# Patient Record
Sex: MALE | Race: WHITE
[De-identification: names, ages, dates, MRNs, and addresses within clinical notes are randomized per-mention and may not be internally consistent; named-entity substitution may affect disease eponyms.]

---

## 2020-10-05 ENCOUNTER — HOSPITAL ENCOUNTER (OUTPATIENT)
Dept: HOSPITAL 26 - MED | Age: 75
Setting detail: OBSERVATION
LOS: 1 days | Discharge: HOME | End: 2020-10-06
Attending: FAMILY MEDICINE | Admitting: FAMILY MEDICINE
Payer: COMMERCIAL

## 2020-10-05 VITALS — SYSTOLIC BLOOD PRESSURE: 128 MMHG | DIASTOLIC BLOOD PRESSURE: 57 MMHG

## 2020-10-05 VITALS — BODY MASS INDEX: 27.7 KG/M2 | WEIGHT: 187 LBS | HEIGHT: 69 IN

## 2020-10-05 DIAGNOSIS — I10: ICD-10-CM

## 2020-10-05 DIAGNOSIS — E78.5: ICD-10-CM

## 2020-10-05 DIAGNOSIS — R07.89: Primary | ICD-10-CM

## 2020-10-05 DIAGNOSIS — E78.00: ICD-10-CM

## 2020-10-05 DIAGNOSIS — I73.9: ICD-10-CM

## 2020-10-05 DIAGNOSIS — R94.31: ICD-10-CM

## 2020-10-05 DIAGNOSIS — F17.210: ICD-10-CM

## 2020-10-05 DIAGNOSIS — E87.6: ICD-10-CM

## 2020-10-05 DIAGNOSIS — Z79.899: ICD-10-CM

## 2020-10-05 LAB
ALBUMIN FLD-MCNC: 3.8 G/DL (ref 3.4–5)
AMYLASE SERPL-CCNC: 86 U/L (ref 25–115)
ANION GAP SERPL CALCULATED.3IONS-SCNC: 15.2 MMOL/L (ref 8–16)
AST SERPL-CCNC: 13 U/L (ref 15–37)
BASOPHILS # BLD AUTO: 0.1 K/UL (ref 0–0.22)
BASOPHILS NFR BLD AUTO: 1.4 % (ref 0–2)
BILIRUB SERPL-MCNC: 0.5 MG/DL (ref 0–1)
BUN SERPL-MCNC: 11 MG/DL (ref 7–18)
CHLORIDE SERPL-SCNC: 102 MMOL/L (ref 98–107)
CHOLEST/HDLC SERPL: 2.9 {RATIO} (ref 1–4.5)
CK MB SERPL-MCNC: 0.6 NG/ML (ref 0–3.6)
CO2 SERPL-SCNC: 24 MMOL/L (ref 21–32)
CREAT SERPL-MCNC: 0.9 MG/DL (ref 0.6–1.3)
EOSINOPHIL # BLD AUTO: 0.4 K/UL (ref 0–0.4)
EOSINOPHIL NFR BLD AUTO: 6.6 % (ref 0–4)
ERYTHROCYTE [DISTWIDTH] IN BLOOD BY AUTOMATED COUNT: 13.9 % (ref 11.6–13.7)
GFR SERPL CREATININE-BSD FRML MDRD: (no result) ML/MIN (ref 90–?)
GLUCOSE SERPL-MCNC: 123 MG/DL (ref 74–106)
HCT VFR BLD AUTO: 36.1 % (ref 36–52)
HDLC SERPL-MCNC: 50 MG/DL (ref 40–60)
HGB BLD-MCNC: 12.4 G/DL (ref 12–18)
LDLC SERPL CALC-MCNC: 49 MG/DL (ref 60–100)
LIPASE SERPL-CCNC: 101 U/L (ref 73–393)
LIPASE SERPL-CCNC: 96 U/L (ref 73–393)
LYMPHOCYTES # BLD AUTO: 1.5 K/UL (ref 2–11.5)
LYMPHOCYTES NFR BLD AUTO: 26.2 % (ref 20.5–51.1)
MAGNESIUM SERPL-MCNC: 2.2 MG/DL (ref 1.8–2.4)
MCH RBC QN AUTO: 32 PG (ref 27–31)
MCHC RBC AUTO-ENTMCNC: 34 G/DL (ref 33–37)
MCV RBC AUTO: 92.3 FL (ref 80–94)
MONOCYTES # BLD AUTO: 0.5 K/UL (ref 0.8–1)
MONOCYTES NFR BLD AUTO: 9.3 % (ref 1.7–9.3)
NEUTROPHILS # BLD AUTO: 3.2 K/UL (ref 1.8–7.7)
NEUTROPHILS NFR BLD AUTO: 56.5 % (ref 42.2–75.2)
PHOSPHATE SERPL-MCNC: 3.4 MG/DL (ref 2.5–4.9)
PLATELET # BLD AUTO: 213 K/UL (ref 140–450)
POTASSIUM SERPL-SCNC: 3.2 MMOL/L (ref 3.5–5.1)
PROTHROMBIN TIME: 10.7 SECS (ref 10.8–13.4)
RBC # BLD AUTO: 3.91 MIL/UL (ref 4.2–6.1)
SODIUM SERPL-SCNC: 138 MMOL/L (ref 136–145)
T4 FREE SERPL-MCNC: 1.03 NG/DL (ref 0.76–1.46)
TRIGL SERPL-MCNC: 236 MG/DL (ref 30–150)
TSH SERPL DL<=0.05 MIU/L-ACNC: 1.55 UIU/ML (ref 0.34–3.74)
WBC # BLD AUTO: 5.6 K/UL (ref 4.8–10.8)

## 2020-10-05 PROCEDURE — 80048 BASIC METABOLIC PNL TOTAL CA: CPT

## 2020-10-05 PROCEDURE — 36415 COLL VENOUS BLD VENIPUNCTURE: CPT

## 2020-10-05 PROCEDURE — 83036 HEMOGLOBIN GLYCOSYLATED A1C: CPT

## 2020-10-05 PROCEDURE — 96361 HYDRATE IV INFUSION ADD-ON: CPT

## 2020-10-05 PROCEDURE — 96372 THER/PROPH/DIAG INJ SC/IM: CPT

## 2020-10-05 PROCEDURE — G0378 HOSPITAL OBSERVATION PER HR: HCPCS

## 2020-10-05 PROCEDURE — 83880 ASSAY OF NATRIURETIC PEPTIDE: CPT

## 2020-10-05 PROCEDURE — 80061 LIPID PANEL: CPT

## 2020-10-05 PROCEDURE — 84100 ASSAY OF PHOSPHORUS: CPT

## 2020-10-05 PROCEDURE — 84443 ASSAY THYROID STIM HORMONE: CPT

## 2020-10-05 PROCEDURE — 83735 ASSAY OF MAGNESIUM: CPT

## 2020-10-05 PROCEDURE — 93005 ELECTROCARDIOGRAM TRACING: CPT

## 2020-10-05 PROCEDURE — 82550 ASSAY OF CK (CPK): CPT

## 2020-10-05 PROCEDURE — 93925 LOWER EXTREMITY STUDY: CPT

## 2020-10-05 PROCEDURE — 83690 ASSAY OF LIPASE: CPT

## 2020-10-05 PROCEDURE — 82553 CREATINE MB FRACTION: CPT

## 2020-10-05 PROCEDURE — 85025 COMPLETE CBC W/AUTO DIFF WBC: CPT

## 2020-10-05 PROCEDURE — 71045 X-RAY EXAM CHEST 1 VIEW: CPT

## 2020-10-05 PROCEDURE — 80053 COMPREHEN METABOLIC PANEL: CPT

## 2020-10-05 PROCEDURE — 85730 THROMBOPLASTIN TIME PARTIAL: CPT

## 2020-10-05 PROCEDURE — 84439 ASSAY OF FREE THYROXINE: CPT

## 2020-10-05 PROCEDURE — 99285 EMERGENCY DEPT VISIT HI MDM: CPT

## 2020-10-05 PROCEDURE — 82150 ASSAY OF AMYLASE: CPT

## 2020-10-05 PROCEDURE — 93970 EXTREMITY STUDY: CPT

## 2020-10-05 PROCEDURE — 87081 CULTURE SCREEN ONLY: CPT

## 2020-10-05 PROCEDURE — 96360 HYDRATION IV INFUSION INIT: CPT

## 2020-10-05 PROCEDURE — 85610 PROTHROMBIN TIME: CPT

## 2020-10-05 PROCEDURE — 84484 ASSAY OF TROPONIN QUANT: CPT

## 2020-10-05 RX ADMIN — Medication SCH MG: at 21:00

## 2020-10-05 RX ADMIN — SODIUM CHLORIDE SCH MLS/HR: 9 INJECTION, SOLUTION INTRAVENOUS at 22:13

## 2020-10-05 NOTE — NUR
BIB self from home with c/o chest pain today and c/o groin pain x 4 weeks. Pain 
8/10

Hungarian speaking only. A, A, Ox4, cooperative, VVS, sinus rhythm in NAD

Resp even and unlabored, HOB elevated.

Moving all exts w/o difficulty, gait steady

Awaiting evaluation by MD

Will continue to monitor

## 2020-10-05 NOTE — NUR
RECEIVED CONTINUITY OF CARE FROM ED RN RICKY. PT IS AMBULATORY, A/OX3, BREATHING 
SPONTANEOUSLY ON 02L 02 VIA NC, SATURATING %. LUNG ARE CTA, ACTIVE BOWEL TONES X4, 
SKIN IS WARM, DRY, INTACT. PT IS Faroese SPEAKING WITH LIMITED ENGLISH. VITALS ARE ASSESSED 
AND WITHIN NORMAL LIMITS. ORIENTED PT TO STAFF, CALL LIGHT, AND ROOM. MRSA SCREEN COMPLETE 
AND REVIEWED PLAN OF CARE WITH PT. CALL LIGHT IS WITHIN REACH. SAFETY PRECAUTIONS IN PLACE.

## 2020-10-05 NOTE — NUR
Patient will be admitted to care of DR SEO. Admited to TELE.  Will go to room 
111A. Belongings list completed.  Report to CIARRA ARRIAZA.

## 2020-10-05 NOTE — NUR
PT IS STILL HAVING CHEST DISCOMFORT TO RIGHT SIDE OF CHEST, MEDICATED WITH 
ADMIT ORDERS FOR NORCO.  PAIN 6/10.  NO SOB.  RESPIRATIONS REGULAR EVEN AND 
UNLABORED

## 2020-10-05 NOTE — NUR
Detailed report given to ARSALAN Jones night shift for continuity

Orders and meds reviewed, questions answered

## 2020-10-06 VITALS — DIASTOLIC BLOOD PRESSURE: 71 MMHG | SYSTOLIC BLOOD PRESSURE: 145 MMHG

## 2020-10-06 VITALS — DIASTOLIC BLOOD PRESSURE: 48 MMHG | SYSTOLIC BLOOD PRESSURE: 96 MMHG

## 2020-10-06 VITALS — SYSTOLIC BLOOD PRESSURE: 126 MMHG | DIASTOLIC BLOOD PRESSURE: 60 MMHG

## 2020-10-06 VITALS — DIASTOLIC BLOOD PRESSURE: 57 MMHG | SYSTOLIC BLOOD PRESSURE: 120 MMHG

## 2020-10-06 VITALS — SYSTOLIC BLOOD PRESSURE: 121 MMHG | DIASTOLIC BLOOD PRESSURE: 65 MMHG

## 2020-10-06 LAB
ANION GAP SERPL CALCULATED.3IONS-SCNC: 14.7 MMOL/L (ref 8–16)
BASOPHILS # BLD AUTO: 0.1 K/UL (ref 0–0.22)
BASOPHILS NFR BLD AUTO: 1 % (ref 0–2)
BUN SERPL-MCNC: 10 MG/DL (ref 7–18)
CHLORIDE SERPL-SCNC: 107 MMOL/L (ref 98–107)
CO2 SERPL-SCNC: 24 MMOL/L (ref 21–32)
CREAT SERPL-MCNC: 0.9 MG/DL (ref 0.6–1.3)
EOSINOPHIL # BLD AUTO: 0.4 K/UL (ref 0–0.4)
EOSINOPHIL NFR BLD AUTO: 7.2 % (ref 0–4)
ERYTHROCYTE [DISTWIDTH] IN BLOOD BY AUTOMATED COUNT: 14 % (ref 11.6–13.7)
GFR SERPL CREATININE-BSD FRML MDRD: (no result) ML/MIN (ref 90–?)
GLUCOSE SERPL-MCNC: 97 MG/DL (ref 74–106)
HCT VFR BLD AUTO: 35.8 % (ref 36–52)
HGB BLD-MCNC: 12 G/DL (ref 12–18)
LYMPHOCYTES # BLD AUTO: 1.5 K/UL (ref 2–11.5)
LYMPHOCYTES NFR BLD AUTO: 28.3 % (ref 20.5–51.1)
MCH RBC QN AUTO: 31 PG (ref 27–31)
MCHC RBC AUTO-ENTMCNC: 34 G/DL (ref 33–37)
MCV RBC AUTO: 93 FL (ref 80–94)
MONOCYTES # BLD AUTO: 0.4 K/UL (ref 0.8–1)
MONOCYTES NFR BLD AUTO: 8 % (ref 1.7–9.3)
NEUTROPHILS # BLD AUTO: 2.9 K/UL (ref 1.8–7.7)
NEUTROPHILS NFR BLD AUTO: 55.5 % (ref 42.2–75.2)
PLATELET # BLD AUTO: 206 K/UL (ref 140–450)
POTASSIUM SERPL-SCNC: 3.7 MMOL/L (ref 3.5–5.1)
RBC # BLD AUTO: 3.85 MIL/UL (ref 4.2–6.1)
SODIUM SERPL-SCNC: 142 MMOL/L (ref 136–145)
WBC # BLD AUTO: 5.3 K/UL (ref 4.8–10.8)

## 2020-10-06 RX ADMIN — Medication SCH MG: at 08:20

## 2020-10-06 RX ADMIN — SODIUM CHLORIDE SCH MLS/HR: 9 INJECTION, SOLUTION INTRAVENOUS at 13:35

## 2020-10-06 NOTE — NUR
PATIENT HAS BEEN SCREENED AND CATEGORIZED AS MODERATE NUTRITION RISK. PATIENT WILL BE SEEN 
WITHIN 3-5 DAYS OF ADMISSION.



10/08/20 10/10/20



JAYDEN GOOD RD

## 2020-10-06 NOTE — NUR
RECEIVED HANDOFF REPORT FROM NIGHT SHIFT RN. PT IS Turkmen SPEAKING, A&OX4. PT IS ON ROOM 
AIR. PT HAS ACCESS AT L HAND, 20 G, WITH NORMAL SALINE IS RUNNING AT 60 ML/HR. PT IS 
AMBULATORY AND SKIN IS INTACT. HOB IS AT 30 DEGREES, BED IN LOW LOCKED POSITION. WILL 
CONTINUE TO MONITOR CLOSELY.

## 2020-10-06 NOTE — NUR
PT DISCHARGED AT THIS TIME IN STABLE CONDITION. DISCHARGE INSTRUCTIONS AND PRESCRIPTIONS 
GIVEN, PT VERBALIZED UNDERSTANDING. BELONGINGS VERIFIED AND RETURNED TO PT. IV SITE REMOVED 
WITH MINIMAL BLOOD LOSS AND LUMEN INTACT. PNA AND FLU VACCINES REFUSED. ID BANDS REMOVED. PT 
SIGNED DISCHARGE PAPERWORK. PT ESCORTED OFF UNIT ON FOOT AND PICKED UP IN PRIVATE CARE BY 
FAMILY MEMBER GOING HOME.

## 2020-10-06 NOTE — NUR
DC PLANNIN YRS OLD MALE PATIENT WAS ADMITTED FROM HOME WITH A DX OF CHEST PAIN . PT HAS A HX OF HTN, 
HLD. CXR SHOWED NO ACUTE CARDIOPULMONARY DISEASE. VENOUS DOPPLER NO DVT. STARTED IVF, 
CONTINUE HOME MEDS. CONSULTED WITH CARDIOLOGIST DR DOAN. DC PLAN TO GO HOME WHEN STABLE. CM 
TO FOLLOW.

## 2020-10-06 NOTE — NUR
SOCIAL WORK NOTE:



Patient's Orientation 

Unable To Assess

Information Provided By 

JANESSA HALL - SON

Comments 

SW WAS UNABLE TO MEET PATIENT AT BEDSIDE. SW COMPLETED ASSESSMENT WITH 

PATIENT'S SON, JANESSA.

, Realtionship and Phone Number 

JANESSA JUDGE

[phone]

Healthcare Power of  

No

Does Patient Have a POLST 

No

Identifying Problems 

No Social Work Triggers

Is A Social Work Consult Needed 

No

Mandate Report Filed 

No

Explanation Of Identifying Problems 

PATIENT IS A 75-YEAR-OLD MALE ADMITTED FOR CHEST PAIN. PATIENT HAS PMHX OF 

HYPERTENSION AND DYSLIPEDEMIA. JANESSA REPORTED PATIENT HAS NO HISTORY OF 

SUBSTANCE ABUSE OR MENTAL HEALTH.

Admitted From 

Home

Pre-Admission Level Of Functioning Status 

Independent/Ambulatory

Prior Resources/Services Used In Last 12 Months 

No Prior Resources Used

Prior DME 

No Prior DME Used

Dialysis Comments 

JANESSA REPORTED THAT PATIENT DOES NOT RECEIVE DIALYSIS.

Living Situation 

Lives With Family

House

Patient Had Caregiver 

No

Home Support 

No Caregiver Issues

Financial Issues 

No Known Financial Issue

Referral To The Financial Counselor Needed 

No

Factors/Needs 

No D/C Needs Identified

Pt/Rep Participated In Discharge Plan 

Yes

Patient/Family Agress With Discharge Plan 

Yes

Discharge Plan Comments 

TENTATIVE DISCHARGE PLAN IS FOR PATIENT TO RETURN HOME.

DC Plan Status 

Initiated

## 2020-10-07 LAB
T3RU NFR SERPL: 30 % (ref 24–39)
T4 SERPL-MCNC: 6.1 UG/DL (ref 4.5–12)

## 2021-08-02 ENCOUNTER — HOSPITAL ENCOUNTER (EMERGENCY)
Dept: HOSPITAL 26 - MED | Age: 76
Discharge: HOME | End: 2021-08-02
Payer: COMMERCIAL

## 2021-08-02 VITALS — SYSTOLIC BLOOD PRESSURE: 150 MMHG | DIASTOLIC BLOOD PRESSURE: 75 MMHG

## 2021-08-02 VITALS — BODY MASS INDEX: 26.36 KG/M2 | HEIGHT: 69 IN | WEIGHT: 178 LBS

## 2021-08-02 VITALS — SYSTOLIC BLOOD PRESSURE: 139 MMHG | DIASTOLIC BLOOD PRESSURE: 67 MMHG

## 2021-08-02 DIAGNOSIS — Z79.82: ICD-10-CM

## 2021-08-02 DIAGNOSIS — E87.6: ICD-10-CM

## 2021-08-02 DIAGNOSIS — Z79.899: ICD-10-CM

## 2021-08-02 DIAGNOSIS — N32.0: Primary | ICD-10-CM

## 2021-08-02 LAB
ANION GAP SERPL CALCULATED.3IONS-SCNC: 14.8 MMOL/L (ref 8–16)
BASOPHILS # BLD AUTO: 0.1 K/UL (ref 0–0.22)
BASOPHILS NFR BLD AUTO: 0.9 % (ref 0–2)
BUN SERPL-MCNC: 14 MG/DL (ref 7–18)
CHLORIDE SERPL-SCNC: 98 MMOL/L (ref 98–107)
CO2 SERPL-SCNC: 24.9 MMOL/L (ref 21–32)
CREAT SERPL-MCNC: 1.2 MG/DL (ref 0.6–1.3)
EOSINOPHIL # BLD AUTO: 0.2 K/UL (ref 0–0.4)
EOSINOPHIL NFR BLD AUTO: 2.8 % (ref 0–4)
ERYTHROCYTE [DISTWIDTH] IN BLOOD BY AUTOMATED COUNT: 14 % (ref 11.6–13.7)
GFR SERPL CREATININE-BSD FRML MDRD: (no result) ML/MIN (ref 90–?)
GLUCOSE SERPL-MCNC: 97 MG/DL (ref 74–106)
HCT VFR BLD AUTO: 37.2 % (ref 36–52)
HGB BLD-MCNC: 12.7 G/DL (ref 12–18)
LYMPHOCYTES # BLD AUTO: 1.6 K/UL (ref 2–11.5)
LYMPHOCYTES NFR BLD AUTO: 27.2 % (ref 20.5–51.1)
MCH RBC QN AUTO: 32 PG (ref 27–31)
MCHC RBC AUTO-ENTMCNC: 34 G/DL (ref 33–37)
MCV RBC AUTO: 94.9 FL (ref 80–94)
MONOCYTES # BLD AUTO: 0.5 K/UL (ref 0.8–1)
MONOCYTES NFR BLD AUTO: 8.1 % (ref 1.7–9.3)
NEUTROPHILS # BLD AUTO: 3.7 K/UL (ref 1.8–7.7)
NEUTROPHILS NFR BLD AUTO: 61 % (ref 42.2–75.2)
PLATELET # BLD AUTO: 247 K/UL (ref 140–450)
POTASSIUM SERPL-SCNC: 2.7 MMOL/L (ref 3.5–5.1)
RBC # BLD AUTO: 3.92 MIL/UL (ref 4.2–6.1)
SODIUM SERPL-SCNC: 135 MMOL/L (ref 136–145)
WBC # BLD AUTO: 6 K/UL (ref 4.8–10.8)

## 2021-08-02 NOTE — NUR
76 Y/O MALE C/O PAINFUL URINATION 3/10 DESCRIBES AS BURNING WITH DIFFICULTY 
STARTING STREAM. PT STATES HE CAME FROM URGENT CARE AND REFERRED DUE TO BPH AND 
MEDICATIONS ARE NOT RELIEVING SYMPTOMS. DENIES DISCHARGE, DENIES N/V, DENIES 
FEVER/CHILLS.



PMH: HTN, HLD, AND BPH



NKA

## 2021-08-02 NOTE — NUR
# 16 FR Saeed catheter with 10 ml utilizing sterile technique.  Immediate 
return of 400 ml clear/yellow urine noted.  Bedside drainage bag placed below 
level of bladder.  Urine sample collected and sent to lab.  Pt tolerated 
procedure well.

## 2021-08-02 NOTE — NUR
PT CURRENTLY RESTING BEDSIDE WITH EYES OPEN. BED IN LOWEST POSITION WITH 
SIDERAIL X2 UP. BED LOCKED. VITAL SIGNS STABLE. WILL CONTINUE TO MONITOR

## 2021-08-02 NOTE — NUR
Saeed leg bag with Flip-flow drainage valve attached to patient's R leg. 
Patient explained how to drain and discard urine from catheter leg bag. Patient 
successfully verbalize how to use bag.